# Patient Record
Sex: MALE | Race: BLACK OR AFRICAN AMERICAN | Employment: FULL TIME | URBAN - METROPOLITAN AREA
[De-identification: names, ages, dates, MRNs, and addresses within clinical notes are randomized per-mention and may not be internally consistent; named-entity substitution may affect disease eponyms.]

---

## 2017-12-13 ENCOUNTER — HOSPITAL ENCOUNTER (OUTPATIENT)
Dept: PHYSICAL THERAPY | Age: 27
End: 2017-12-13

## 2017-12-18 ENCOUNTER — HOSPITAL ENCOUNTER (OUTPATIENT)
Dept: PHYSICAL THERAPY | Age: 27
Discharge: HOME | End: 2017-12-18
Payer: COMMERCIAL

## 2017-12-18 PROCEDURE — 97110 THERAPEUTIC EXERCISES: CPT | Performed by: PHYSICAL THERAPIST

## 2017-12-18 PROCEDURE — 97161 PT EVAL LOW COMPLEX 20 MIN: CPT | Performed by: PHYSICAL THERAPIST

## 2017-12-18 NOTE — PROGRESS NOTES
PT DAILY TREATMENT NOTE     Patient Name: Urvashi August  Date:2017  : 1990  [x]  Patient  Verified  Payor: Damien Garcia / Plan: Phoebe Stanton / Product Type: Commerical /    In DCFE:8814  Out time:0930  Total Treatment Time (min): 55  Visit #: 1 of 8    Treatment Area: Bursitis of left shoulder [M75.52]  Bursitis of right shoulder [M75.51]    SUBJECTIVE  Pain Level (0-10 scale): bilateral shoulder 5/10   Any medication changes, allergies to medications, adverse drug reactions, diagnosis change, or new procedure performed?: [x] No    [] Yes (see summary sheet for update)  Subjective functional status/changes:   [] No changes reported  Bilateral shoulders     OBJECTIVE      40 min [x]Eval                  []Re-Eval       15 min Therapeutic Exercise:  [x] See flow sheet :   Rationale: increase ROM, increase strength and increase proprioception to improve the patients ability to return to prior level of function          With   [] TE   [] TA   [] neuro   [] other: Patient Education: [x] Review HEP    [] Progressed/Changed HEP based on:   [] positioning   [] body mechanics   [] transfers   [] heat/ice application    [] other:      Other Objective/Functional Measures: see eval      Pain Level (0-10 scale) post treatment: 2    ASSESSMENT/Changes in Function: pt is a motivated male age 28y/o 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 Today he presents with a hx of bilateral shoulder lateral acromium pain since increasing overhead weight lifting pounds in May 2017. Since then he notes his pain has been getting better but he has not been able to return to weight lifting UE due to concern for aggrevating his sx. Pt is only mild ttp over lateral acromium bilaterally , mild impingement sign with wesley gabby, cross arm Right , empty can left . MMT grossly 5/5 with no pain all directions.  ROM mild tight FE, and abd and posture significant for tight pecs , forward shoulder head slouched sitting posture. Pt would benefit from skilled physical therapy for bursitis /impingment symptoms bilateral shoulder to return to prior level of function. Patient will continue to benefit from skilled PT services to modify and progress therapeutic interventions, address functional mobility deficits, address ROM deficits, address strength deficits, analyze and address soft tissue restrictions, analyze and cue movement patterns, analyze and modify body mechanics/ergonomics and assess and modify postural abnormalities to attain remaining goals. [x]  See Plan of Care  []  See progress note/recertification  []  See Discharge Summary         Progress towards goals / Updated goals:  ST. Pt compliant with HEP for max progression toward all goals   @Eval : started on HEP   2. Pt  pain 40% better allowing him to return to light weight training UE at gym  @Eval: 7/10 worse         LT. Pt idep with HEP for max progression toward all goals and continued recover post therapy. @Eval : started on HEP    2. Pt  pain 80% better allowing him to return to heavier weight training UE at gym PLOF.  @Eval: 7/10 worse  3. UE = and full bilateral with all directions   @Eval: FE bilat approx 160-170, abd R170, L 165 , IR T6R/ T7 L    4. Pt demonstrates upright sitting posture without cues leading to increase freedom at shoulder joint for functional mobility overhead reach without pain.  @ Eval : very slouched forward shoulder sitting posture       PLAN  [x]  Upgrade activities as tolerated     [x]  Continue plan of care  []  Update interventions per flow sheet       []  Discharge due to:_  []  Other:_      Wild Acuña, PT 2017  8:39 AM    No future appointments.

## 2017-12-18 NOTE — PROGRESS NOTES
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 Today he presents with a hx of bilateral shoulder lateral acromium pain since increasing overhead weight lifting pounds in May 2017. Since then he notes his pain has been getting better but he has not been able to return to weight lifting UE due to concern for aggrevating his sx. Pt is only mild ttp over lateral acromium bilaterally , mild impingement sign with wesley gabby, cross arm Right , empty can left . MMT grossly 5/5 with no pain all directions. ROM mild tight FE, and abd and posture significant for tight pecs , forward shoulder head slouched sitting posture.  Pt would benefit from skilled physical therapy for bursitis /impingment symptoms bilateral shoulder to return to prior level of function.        Evaluation Complexity History LOW Complexity : Zero comorbidities / personal factors that will impact the outcome / POC; Examination HIGH Complexity : 4+ Standardized tests and measures addressing body structure, function, activity limitation and / or participation in recreation  ;Presentation LOW Complexity : Stable, uncomplicated  ;Clinical Decision Making LOW Complexity : FOTO score of   Overall Complexity Rating: LOW   Problem List: pain affecting function, decrease activity tolerance, decrease flexibility/ joint mobility                      Treatment Plan may include any combination of the following: Therapeutic exercise, Therapeutic activities, Neuromuscular re-education, Physical agent/modality,  Manual therapy, Patient education   Patient / Family readiness to learn indicated by: asking questions, trying to perform skills and interest  Persons(s) to be included in education: patient (P)  Barriers to Learning/Limitations: None    Patient Goal (s): releif    Patient Self Reported Health Status: excellent    Rehabilitation Potential: excellent    Short Term Goals: To be accomplished in 2 weeks:  1. Pt compliant with HEP for max progression toward all goals   @Eval : started on HEP   2. Pt  pain 40% better allowing him to return to light weight training UE at gym  @Eval: 7/10 worse     Long Term Goals: To be accomplished in 4 weeks:  1. Pt idep with HEP for max progression toward all goals and continued recover post therapy. @Eval : started on HEP    2. Pt  pain 80% better allowing him to return to heavier weight training UE at gym PLOF.  @Eval: 7/10 worse  3. UE = and full bilateral with all directions   @Eval: FE bilat approx 160-170, abd R170, L 165 , IR T6R/ T7 L    4. Pt demonstrates upright sitting posture without cues leading to increase freedom at shoulder joint for functional mobility overhead reach without pain.  @ Eval : very slouched forward shoulder sitting posture       Frequency / Duration: Patient to be seen 2 times per week for 4 weeks.     Patient/ Caregiver education and instruction: Diagnosis, prognosis, Exercises   [x]  Plan of care has been reviewed with Pt    Lupe Daily PT 12/18/2017 12:44 PM    ________________________________________________________________________    I certify that the above Therapy Services are being furnished while the patient is under my care. I agree with the treatment plan and certify that this therapy is necessary.     Physician's Signature:___________________  Date:____________Time: _________    Please sign and return to [facility] at [address] [place] [address]     Phone: [phone]   Fax: [phone]

## 2017-12-20 ENCOUNTER — HOSPITAL ENCOUNTER (OUTPATIENT)
Dept: PHYSICAL THERAPY | Age: 27
Discharge: HOME | End: 2017-12-20
Payer: COMMERCIAL

## 2017-12-20 PROCEDURE — 97112 NEUROMUSCULAR REEDUCATION: CPT

## 2017-12-20 PROCEDURE — 97016 VASOPNEUMATIC DEVICE THERAPY: CPT

## 2017-12-20 NOTE — PROGRESS NOTES
PT DAILY TREATMENT NOTE     Patient Name: Olive Vance  Date:2017  : 1990  [x]  Patient  Verified  Payor: Tiara Shin / Plan: Aubrie Render / Product Type: Commerical /    In time:1205  Out time:1250  Total Treatment Time (min): 45  Visit #: 2 of 8    Treatment Area: Bilateral shoulder pain [M25.511, M25.512]    SUBJECTIVE  Pain Level (0-10 scale): 0/10  Any medication changes, allergies to medications, adverse drug reactions, diagnosis change, or new procedure performed?: [x] No    [] Yes (see summary sheet for update)  Subjective functional status/changes:   [] No changes reported  Pt reports that his right top of his shoulder is painful sometimes.     OBJECTIVE    Modality rationale: decrease inflammation and decrease pain to improve the patients ability to complete ADLs   Min Type Additional Details    [] Estim:  []Unatt       []IFC  []Premod                        []Other:  []w/ice   []w/heat  Position:  Location:    [] Estim: []Att    []TENS instruct  []NMES                    []Other:  []w/US   []w/ice   []w/heat  Position:  Location:    []  Traction: [] Cervical       []Lumbar                       [] Prone          []Supine                       []Intermittent   []Continuous Lbs:  [] before manual  [] after manual    []  Ultrasound: []Continuous   [] Pulsed                           []1MHz   []3MHz W/cm2:  Location:    []  Iontophoresis with dexamethasone         Location: [] Take home patch   [] In clinic    []  Ice     []  heat  []  Ice massage  []  Laser   []  Anodyne Position:  Location:    []  Laser with stim  []  Other:  Position:  Location:   10 [x]  Vasopneumatic Device Pressure:       [] lo [x] med [] hi   Temperature: [] lo [x] med [] hi   [x] Skin assessment post-treatment:  [x]intact [x]redness- no adverse reaction    []redness - adverse reaction:       35 min Neuromuscular Re-education:  []  See flow sheet :   Rationale: increase ROM, increase strength, improve coordination, improve balance and increase proprioception  to improve the patients ability to complete ADLs          With   [] TE   [] TA   [x] neuro   [] other: Patient Education: [x] Review HEP    [] Progressed/Changed HEP based on:   [x] positioning   [x] body mechanics   [] transfers   [] heat/ice application    [x] other:shoulder/chest openers      Other Objective/Functional Measures:      Pain Level (0-10 scale) post treatment: 0/10    ASSESSMENT/Changes in Function: Pt demonstrates limited thoracic mobility contributing to shoulder pain. Added thoracic mobilizers to help promote functional movement and provided HEP    Patient will continue to benefit from skilled PT services to address functional mobility deficits, address ROM deficits, address strength deficits, analyze and address soft tissue restrictions, analyze and cue movement patterns, analyze and modify body mechanics/ergonomics and assess and modify postural abnormalities to attain remaining goals. []  See Plan of Care  []  See progress note/recertification  []  See Discharge Summary         Progress towards goals / Updated goals:  Short Term Goals: To be accomplished in 2 weeks:  1. Pt compliant with HEP for max progression toward all goals   @Eval : started on HEP   Current: added to HEP  2. Pt  pain 40% better allowing him to return to light weight training UE at gym  @Eval: 7/10 worse     Current: No change  Long Term Goals: To be accomplished in 4 weeks:  1. Pt idep with HEP for max progression toward all goals and continued recover post therapy. @Eval : started on HEP    2.  Pt  pain 80% better allowing him to return to heavier weight training UE at gym PLOF.  @Eval: 7/10 worse  3. UE = and full bilateral with all directions   @Eval: FE bilat approx 160-170, abd R170, L 165 , IR T6R/ T7 L    4. Pt demonstrates upright sitting posture without cues leading to increase freedom at shoulder joint for functional mobility overhead reach without pain.  @ Eval : very slouched forward shoulder sitting posture     PLAN  []  Upgrade activities as tolerated     []  Continue plan of care  []  Update interventions per flow sheet       []  Discharge due to:_  []  Other:_      Juanito Garcia PTA 12/20/2017  2:05 PM    Future Appointments  Date Time Provider Basilio Choe   12/27/2017 11:45 AM Juanito Garcia PTA [facility]   12/29/2017 1:30 PM SHANI Crespo [facility]

## 2017-12-27 ENCOUNTER — APPOINTMENT (OUTPATIENT)
Dept: PHYSICAL THERAPY | Age: 27
End: 2017-12-27
Payer: COMMERCIAL

## 2017-12-29 ENCOUNTER — APPOINTMENT (OUTPATIENT)
Dept: PHYSICAL THERAPY | Age: 27
End: 2017-12-29
Payer: COMMERCIAL

## 2018-02-13 NOTE — PROGRESS NOTES
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 Pt compliant with HEP for max progression toward all goals   @Eval : started on HEP   Current: added to HEP  2. Pt  pain 40% better allowing him to return to light weight training UE at gym  @Eval: 7/10 worse     Current: No change  Long Term Goals: To be accomplished in 4 weeks:  1. Pt idep with HEP for max progression toward all goals and continued recover post therapy. @Eval : started on HEP    2. Pt  pain 80% better allowing him to return to heavier weight training UE at gym PLOF.  @Eval: 7/10 worse  3. UE = and full bilateral with all directions   @Eval: FE bilat approx 160-170, abd R170, L 165 , IR T6R/ T7 L    4. Pt demonstrates upright sitting posture without cues leading to increase freedom at shoulder joint for functional mobility overhead reach without pain.  @ Eval : very slouched forward shoulder sitting posture       Assessment/ Summary of Care: No progress or functional gains made as pt only seen for one visit after initial eval, with last visit seen on 17.     RECOMMENDATIONS:  [x]Discontinue therapy: []Patient has reached or is progressing toward set goals      [x]Patient is non-compliant or has abdicated      []Due to lack of appreciable progress towards set goals    Usha Chadwick, PT 2018 2:12 PM

## 2024-01-22 ENCOUNTER — TELEPHONE (OUTPATIENT)
Facility: HOSPITAL | Age: 34
End: 2024-01-22

## 2024-01-22 ENCOUNTER — HOSPITAL ENCOUNTER (OUTPATIENT)
Facility: HOSPITAL | Age: 34
Setting detail: RECURRING SERIES
Discharge: HOME | End: 2024-01-25

## 2024-01-22 NOTE — PROGRESS NOTES
Pt arrived for evaluation but during history it was discovered by our front office there may be a problem with his referring provider's licensure. Discussed with pt and opted to reschedule once clarified.    *Evaluation rescheduled due to referring provider licensure concern.*

## 2024-02-06 ENCOUNTER — HOSPITAL ENCOUNTER (OUTPATIENT)
Facility: HOSPITAL | Age: 34
Setting detail: RECURRING SERIES
Discharge: HOME | End: 2024-02-09
Payer: OTHER GOVERNMENT

## 2024-02-06 PROCEDURE — 97161 PT EVAL LOW COMPLEX 20 MIN: CPT

## 2024-02-06 PROCEDURE — 97535 SELF CARE MNGMENT TRAINING: CPT

## 2024-02-06 PROCEDURE — 97110 THERAPEUTIC EXERCISES: CPT

## 2024-02-07 NOTE — PROGRESS NOTES
mobility.   Eval Status: issued at eval    Pt will have painfree trunk ext AROM to full range to aid in functional mechanics for daily activity and improve comfort with running.  Eval Status: 75% of normal with pain in lower back    3. Pt will demonstrate 30 degrees 90/90 HS flexibility bilaterally to improve lumbopelvic mechanics with lifting tasks.   Eval Status: -40 bilat    Long Term Goals: To be accomplished in 8 weeks:  Patient will improve FOTO score to 67 points to improve functional tolerance for daily and work tasks.  Eval Status: FOTO 56  FOTO score = an established functional score where 100 = no disability    Pt will demonstrate (-) Gurpreet test bilat to aid in functional mechanics for running and lifting tasks.  Eval Status: (+) left    Pt will have 5/5 trunk ext MMT strength to return to goals of lifting with improved ease.  Eval Status: 3/5 with increased LBP    Patient will demonstrate ability to jog for 5 min on TM at 5.0 mph or better void of pain to improve running tolerance and facilitate progression to return to run program.  Eval Status: pain with running per pt  Frequency / Duration: Patient to be seen 2 times per week for 8 weeks    Patient/ Caregiver education and instruction: Diagnosis, prognosis, self care, activity modification, and exercises     [x]  Plan of care has been reviewed with PTA    Certification Period: n/a  Tee Cabral, PT 2/6/2024 7:16 PM  _____________________________________________________________________  I certify that the above Therapy Services are being furnished while the patient is under my care. I agree with the treatment plan and certify that this therapy is necessary.    Physician's Signature:_______________________ Date:_________ TIME:________                              Leigh Healy APRN -*  Insurance: Payor:  EAST / Plan:  EAST / Product Type: *No Product type* /      ** Signature, Date and Time must be completed for valid certification 
  Hip Flexion (L1,2) 5 5 []   Knee Extension (L3,4) 5 5 []   Ankle Dorsiflexion (L4) 5 5 []   Great Toe Extension (L5) 5 5 []   Ankle Plantarflexion (S1) 5 5 []   Knee Flexion (S1,2) 5 5 []   Abdominals   []   Paraspinals- Trunk ext MMT 3 pain limited  []   Back Rotators 5 5 []   Gluteus Elfego 4+ 4+ []   Hip Abduction 4+ 4+ []   Hip ER 5 5 []   Hip IR 5 5 []       Special Tests  Lumbar:  Lumb. Compression: [] Pos  [x] Neg               Lumbar Distraction:   [] Pos  [x] Neg    Quadrant:  [] Pos  [x] Neg   [] Flex  [] Ext    Sacroilliac:  Gaenslen's:  [] Right    [] Left    [] +    [x] -     Compression:  [] +    [x] -     Gapping:   [] +    [x] -     Thigh Thrust:  [] Right    [] Left    [] +    [x] -     One Finger Test:   [] Right    [] Left    [] +    [x] -            Hip: Blanca:   [] Right    [] Left    [] +    [x] -     Scour:   [] Right    [] Left    [] +    [x] -     Piriformis:  [] Right    [] Left    [] +    [x] -     Faddir:   [] Right    [] Left    [] +    [x] -    90/90 HS:   Right -40, left -40    Gurpreet Test: minimally (+) left side    Other tests/ comments:     ASSESSMENT/Changes in Function: Pt is a 32 yo male who [facility] to [facility] with c/o LBP and shoulder pain with chief complaint of back pain at this time with onset during training in June 2023. Reports no specific precipitating event, but notes overuse as cause as he significantly increased his physical activity during this time. Notes pain that has worsened since and limits his ability to run, lift, and sit for prolonged periods without pain in his back. At this time, pt has opted to focus treatment on his back as this is the larger concern, but will plan to re-assess for his shoulders at a later date as necessary and provide further treatment if indicated. Shoulders not assessed today due to time constraints and pt wish to focus on CC of LBP. Pt presents with signs and symptoms consistent with mechanical LBP with associated deficits in

## 2024-02-13 ENCOUNTER — HOSPITAL ENCOUNTER (OUTPATIENT)
Facility: HOSPITAL | Age: 34
Setting detail: RECURRING SERIES
Discharge: HOME | End: 2024-02-16
Payer: OTHER GOVERNMENT

## 2024-02-13 PROCEDURE — 97530 THERAPEUTIC ACTIVITIES: CPT

## 2024-02-13 PROCEDURE — 97112 NEUROMUSCULAR REEDUCATION: CPT

## 2024-02-13 PROCEDURE — 97110 THERAPEUTIC EXERCISES: CPT

## 2024-02-13 PROCEDURE — 97140 MANUAL THERAPY 1/> REGIONS: CPT

## 2024-02-13 NOTE — PROGRESS NOTES
ease.  Eval Status: 3/5 with increased LBP     Patient will demonstrate ability to jog for 5 min on TM at 5.0 mph or better void of pain to improve running tolerance and facilitate progression to return to run program.  Eval Status: pain with running per pt    PLAN  Yes  Continue plan of care  []  Upgrade activities as tolerated  []  Discharge due to :  []  Other:    Ron Giordano, SUNNY    2/13/2024    2:45 PM    Future Appointments   Date Time Provider Department Center   2/13/2024  6:10 PM Tee Cabral, PT [facility]   2/20/2024  8:10 AM Wilber Purcell, PT [facility]   2/22/2024  5:30 PM Wilber Purcell, PT [facility]   2/26/2024  6:10 PM Asad Roper, PT [facility]   3/1/2024  8:10 AM Wilber Purcell, PT [facility]

## 2024-02-20 ENCOUNTER — APPOINTMENT (OUTPATIENT)
Facility: HOSPITAL | Age: 34
End: 2024-02-20
Payer: OTHER GOVERNMENT

## 2024-02-20 ENCOUNTER — TELEPHONE (OUTPATIENT)
Facility: HOSPITAL | Age: 34
End: 2024-02-20

## 2024-02-22 ENCOUNTER — HOSPITAL ENCOUNTER (OUTPATIENT)
Facility: HOSPITAL | Age: 34
Setting detail: RECURRING SERIES
Discharge: HOME | End: 2024-02-25
Payer: OTHER GOVERNMENT

## 2024-02-22 PROCEDURE — 97110 THERAPEUTIC EXERCISES: CPT

## 2024-02-22 PROCEDURE — 97112 NEUROMUSCULAR REEDUCATION: CPT

## 2024-02-22 PROCEDURE — 97530 THERAPEUTIC ACTIVITIES: CPT

## 2024-02-22 NOTE — PROGRESS NOTES
PHYSICAL / OCCUPATIONAL THERAPY - DAILY TREATMENT NOTE (updated )    Patient Name: Sheldon Palma    Date: 2024    : 1990  Insurance: Payor:  EAST / Plan:  EAST / Product Type: *No Product type* /      Patient  verified Yes     Visit #   Current / Total 3 16   Time   In / Out 1735 1830   Pain   In / Out 7 5   Subjective Functional Status/Changes: \"I had a six hour car ride yesterday and that really aggravated my back. It has been hurting a lot since.\"   Changes to:  Meds, Allergies, Med Hx, Sx Hx?  If yes, update Summary List no       TREATMENT AREA =  Other low back pain [M54.59]  Pain in right shoulder [M25.511]  Pain in left shoulder [M25.512]    OBJECTIVE    Therapeutic Procedures:  Tx Min Billable or 1:1 Min (if diff from Tx Min) Procedure, Rationale, Specifics   30  59989 Therapeutic Exercise (timed):  increase ROM, strength, coordination, balance, and proprioception to improve patient's ability to progress to PLOF and address remaining functional goals. (see flow sheet as applicable)     Details if applicable:       15  47940 Therapeutic Activity (timed):  use of dynamic activities replicating functional movements to increase ROM, strength, coordination, balance, and proprioception in order to improve patient's ability to progress to PLOF and address remaining functional goals.  (see flow sheet as applicable)     Details if applicable:     10  61635 Neuromuscular Re-Education (timed):  improve balance, coordination, kinesthetic sense, posture, core stability and proprioception to improve patient's ability to develop conscious control of individual muscles and awareness of position of extremities in order to progress to PLOF and address remaining functional goals. (see flow sheet as applicable)     Details if applicable:     55  [facility] Totals Reminder: bill using total billable min of TIMED therapeutic procedures (example: do not include dry needle or estim unattended, both

## 2024-02-26 ENCOUNTER — APPOINTMENT (OUTPATIENT)
Facility: HOSPITAL | Age: 34
End: 2024-02-26
Payer: OTHER GOVERNMENT

## 2024-02-26 ENCOUNTER — TELEPHONE (OUTPATIENT)
Facility: HOSPITAL | Age: 34
End: 2024-02-26

## 2024-03-01 ENCOUNTER — HOSPITAL ENCOUNTER (OUTPATIENT)
Facility: HOSPITAL | Age: 34
Setting detail: RECURRING SERIES
Discharge: HOME | End: 2024-03-04
Payer: OTHER GOVERNMENT

## 2024-03-01 PROCEDURE — 97112 NEUROMUSCULAR REEDUCATION: CPT

## 2024-03-01 PROCEDURE — 97110 THERAPEUTIC EXERCISES: CPT

## 2024-03-01 PROCEDURE — 97530 THERAPEUTIC ACTIVITIES: CPT

## 2024-03-01 PROCEDURE — 97140 MANUAL THERAPY 1/> REGIONS: CPT

## 2024-03-01 NOTE — PROGRESS NOTES
PHYSICAL / OCCUPATIONAL THERAPY - DAILY TREATMENT NOTE (updated )    Patient Name: Sheldon Palma    Date: 3/1/2024    : 1990  Insurance: Payor: [employer] EAST / Plan: [employer] EAST / Product Type: *No Product type* /      Patient  verified Yes     Visit #   Current / Total 4 15   Time   In / Out 0811 0905   Pain   In / Out 5 5   Subjective Functional Status/Changes: \"I still get some flare ups of the pain. But, the bending forward stretches you taught me help me to get the pain to come back down.\"   Changes to:  Meds, Allergies, Med Hx, Sx Hx?  If yes, update Summary List no       TREATMENT AREA =  Other low back pain [M54.59]  Pain in right shoulder [M25.511]  Pain in left shoulder [M25.512]    OBJECTIVE    Therapeutic Procedures:  Tx Min Billable or 1:1 Min (if diff from Tx Min) Procedure, Rationale, Specifics   16  64642 Therapeutic Exercise (timed):  increase ROM, strength, coordination, balance, and proprioception to improve patient's ability to progress to PLOF and address remaining functional goals. (see flow sheet as applicable)     Details if applicable:       15  64850 Therapeutic Activity (timed):  use of dynamic activities replicating functional movements to increase ROM, strength, coordination, balance, and proprioception in order to improve patient's ability to progress to PLOF and address remaining functional goals.  (see flow sheet as applicable)     Details if applicable:     15  50065 Neuromuscular Re-Education (timed):  improve balance, coordination, kinesthetic sense, posture, core stability and proprioception to improve patient's ability to develop conscious control of individual muscles and awareness of position of extremities in order to progress to PLOF and address remaining functional goals. (see flow sheet as applicable)     Details if applicable:     10  47588 Manual Therapy (timed):  decrease pain, increase ROM, and increase tissue extensibility to improve patient's ability

## 2024-03-13 ENCOUNTER — TELEPHONE (OUTPATIENT)
Facility: HOSPITAL | Age: 34
End: 2024-03-13

## 2024-03-13 NOTE — TELEPHONE ENCOUNTER
Called pt to see if he wanted to sched more appts but he was in zoom meeting & would call us back later.

## 2024-04-03 ENCOUNTER — HOSPITAL ENCOUNTER (OUTPATIENT)
Facility: HOSPITAL | Age: 34
Setting detail: RECURRING SERIES
Discharge: HOME | End: 2024-04-06
Payer: OTHER GOVERNMENT

## 2024-04-03 PROCEDURE — 97112 NEUROMUSCULAR REEDUCATION: CPT

## 2024-04-03 PROCEDURE — 97110 THERAPEUTIC EXERCISES: CPT

## 2024-04-03 PROCEDURE — 97530 THERAPEUTIC ACTIVITIES: CPT

## 2024-04-03 NOTE — PROGRESS NOTES
PHYSICAL / OCCUPATIONAL THERAPY - DAILY TREATMENT NOTE (updated )    Patient Name: Sheldon Palma    Date: 4/3/2024    : 1990  Insurance: Payor:  EAST / Plan: [employer] EAST / Product Type: *No Product type* /      Patient  verified Yes     Visit #   Current / Total 4 15   Time   In / Out 1610 1710   Pain   In / Out 5 5   Subjective Functional Status/Changes: \"I have been out of  tow attending a family matter. Then I had to leave town for work. I am going to be more consistent over the next month. \"   Changes to:  Meds, Allergies, Med Hx, Sx Hx?  If yes, update Summary List no       TREATMENT AREA =  Other low back pain [M54.59]  Pain in right shoulder [M25.511]  Pain in left shoulder [M25.512]    OBJECTIVE    Therapeutic Procedures:  Tx Min Billable or 1:1 Min (if diff from Tx Min) Procedure, Rationale, Specifics   30  50846 Therapeutic Exercise (timed):  increase ROM, strength, coordination, balance, and proprioception to improve patient's ability to progress to PLOF and address remaining functional goals. (see flow sheet as applicable)     Details if applicable:       15  05459 Therapeutic Activity (timed):  use of dynamic activities replicating functional movements to increase ROM, strength, coordination, balance, and proprioception in order to improve patient's ability to progress to PLOF and address remaining functional goals.  (see flow sheet as applicable)     Details if applicable:     15  02962 Neuromuscular Re-Education (timed):  improve balance, coordination, kinesthetic sense, posture, core stability and proprioception to improve patient's ability to develop conscious control of individual muscles and awareness of position of extremities in order to progress to PLOF and address remaining functional goals. (see flow sheet as applicable)     Details if applicable:     0  82099 Manual Therapy (timed):  decrease pain, increase ROM, and increase tissue extensibility to improve patient's

## 2024-04-03 NOTE — PROGRESS NOTES
[facility]  [address] [address]    Phone: [phone]   Fax: [phone]    Progress Note  Patient name: Sheldon Palma Start of Care: 2024    Referral source: Leigh Healy APRN -* : 1990   Medical/Treatment Diagnosis: Other low back pain [M54.59]  Pain in right shoulder [M25.511]  Pain in left shoulder [M25.512] Onset Date:~23       Prior Hospitalization: see medical history Provider#: [id number]   Medications: Verified on Patient Summary List    Comorbidities: shoulder pain, ankle and foot pain, following up with ortho regarding left knee, sleep study upcoming due to sleep concerns    Prior Level of Function:functionally independent, no AD, active lifestyle, AD , lifts in gym several days a week      Visits from Start of Care: 5       Updated Goals/Measure of Progress:     Short Term Goals: To be accomplished in 4 weeks  Patient will be independent and compliant with HEP to progress toward goals and restore functional mobility.   Eval Status: issued at eval  Current.  In-progress, developing consistency with HEP, 4/3/2024     Pt will have painfree trunk ext AROM to full range to aid in functional mechanics for daily activity and improve comfort with running.  Eval Status: 75% of normal with pain in lower back  Current: In-progress, 75% 4/3/2024      3. Pt will demonstrate 30 degrees 90/90 HS flexibility bilaterally to improve lumbopelvic mechanics with lifting tasks.              Eval Status: -40 bilat              Current  In-progress,  significant limitation in hamstring mobility, 4/3/2024     Long Term Goals: To be accomplished in 8 weeks:  Patient will improve FOTO score to 67 points to improve functional tolerance for daily and work tasks.  Eval Status: FOTO 56  FOTO score = an established functional score where 100 = no disability     Pt will demonstrate (-) Gurpreet test bilat to aid in functional mechanics for running and

## 2024-04-05 ENCOUNTER — HOSPITAL ENCOUNTER (OUTPATIENT)
Facility: HOSPITAL | Age: 34
Setting detail: RECURRING SERIES
Discharge: HOME | End: 2024-04-08
Payer: OTHER GOVERNMENT

## 2024-04-05 PROCEDURE — 97112 NEUROMUSCULAR REEDUCATION: CPT

## 2024-04-05 PROCEDURE — 97110 THERAPEUTIC EXERCISES: CPT

## 2024-04-05 PROCEDURE — 97530 THERAPEUTIC ACTIVITIES: CPT

## 2024-04-05 NOTE — PROGRESS NOTES
PHYSICAL / OCCUPATIONAL THERAPY - DAILY TREATMENT NOTE (updated )    Patient Name: Sheldon Palma    Date: 2024    : 1990  Insurance: Payor:  EAST / Plan: [facility] / Product Type: *No Product type* /      Patient  verified Yes     Visit #   Current / Total 6 15   Time   In / Out 0811 0908   Pain   In / Out 7 5   Subjective Functional Status/Changes: \"I was pretty sore after the most recent PT workout, but I understand all the stretching might make me more sore before I start to feel better.\"   Changes to:  Meds, Allergies, Med Hx, Sx Hx?  If yes, update Summary List no       TREATMENT AREA =  Other low back pain [M54.59]  Pain in right shoulder [M25.511]  Pain in left shoulder [M25.512]    OBJECTIVE    Therapeutic Procedures:  Tx Min Billable or 1:1 Min (if diff from Tx Min) Procedure, Rationale, Specifics   27  87198 Therapeutic Exercise (timed):  increase ROM, strength, coordination, balance, and proprioception to improve patient's ability to progress to PLOF and address remaining functional goals. (see flow sheet as applicable)     Details if applicable:       15  11779 Therapeutic Activity (timed):  use of dynamic activities replicating functional movements to increase ROM, strength, coordination, balance, and proprioception in order to improve patient's ability to progress to PLOF and address remaining functional goals.  (see flow sheet as applicable)     Details if applicable:     15  52574 Neuromuscular Re-Education (timed):  improve balance, coordination, kinesthetic sense, posture, core stability and proprioception to improve patient's ability to develop conscious control of individual muscles and awareness of position of extremities in order to progress to PLOF and address remaining functional goals. (see flow sheet as applicable)     Details if applicable:     57  [facility] Totals Reminder: bill using total billable min of TIMED therapeutic procedures (example: do not include

## 2024-04-09 ENCOUNTER — HOSPITAL ENCOUNTER (OUTPATIENT)
Facility: HOSPITAL | Age: 34
Setting detail: RECURRING SERIES
End: 2024-04-09
Payer: OTHER GOVERNMENT

## 2024-04-09 ENCOUNTER — TELEPHONE (OUTPATIENT)
Facility: HOSPITAL | Age: 34
End: 2024-04-09

## 2024-04-09 NOTE — TELEPHONE ENCOUNTER
Called pt to RS appts as his body needs 24+ hrs to recover from PT. Pt decided to come tomorrow @ 7:30 & RS 6:10 appt to Fri @ 8:10. Will check sched to RS future appts.

## 2024-04-10 ENCOUNTER — HOSPITAL ENCOUNTER (OUTPATIENT)
Facility: HOSPITAL | Age: 34
Setting detail: RECURRING SERIES
Discharge: HOME | End: 2024-04-13
Payer: OTHER GOVERNMENT

## 2024-04-10 ENCOUNTER — TELEPHONE (OUTPATIENT)
Facility: HOSPITAL | Age: 34
End: 2024-04-10

## 2024-04-10 PROCEDURE — 97112 NEUROMUSCULAR REEDUCATION: CPT

## 2024-04-10 PROCEDURE — 97530 THERAPEUTIC ACTIVITIES: CPT

## 2024-04-10 PROCEDURE — 97110 THERAPEUTIC EXERCISES: CPT

## 2024-04-10 NOTE — PROGRESS NOTES
PHYSICAL / OCCUPATIONAL THERAPY - DAILY TREATMENT NOTE (updated )    Patient Name: Sheldon Palma    Date: 4/10/2024    : 1990  Insurance: Payor:  EAST / Plan:  EAST / Product Type: *No Product type* /      Patient  verified Yes     Visit #   Current / Total 7 15   Time   In / Out 0735 0837   Pain   In / Out 3 2-3   Subjective Functional Status/Changes: \"I have been going to the gym and working on my back. But, I am keeping the exercises light to work on endurance and not as much on strength.\"   Changes to:  Meds, Allergies, Med Hx, Sx Hx?  If yes, update Summary List no       TREATMENT AREA =  Other low back pain [M54.59]  Pain in right shoulder [M25.511]  Pain in left shoulder [M25.512]    OBJECTIVE    Therapeutic Procedures:  Tx Min Billable or 1:1 Min (if diff from Tx Min) Procedure, Rationale, Specifics   32  80756 Therapeutic Exercise (timed):  increase ROM, strength, coordination, balance, and proprioception to improve patient's ability to progress to PLOF and address remaining functional goals. (see flow sheet as applicable)     Details if applicable:       15  12212 Therapeutic Activity (timed):  use of dynamic activities replicating functional movements to increase ROM, strength, coordination, balance, and proprioception in order to improve patient's ability to progress to PLOF and address remaining functional goals.  (see flow sheet as applicable)     Details if applicable:     15  28418 Neuromuscular Re-Education (timed):  improve balance, coordination, kinesthetic sense, posture, core stability and proprioception to improve patient's ability to develop conscious control of individual muscles and awareness of position of extremities in order to progress to PLOF and address remaining functional goals. (see flow sheet as applicable)     Details if applicable:     62  [facility] Totals Reminder: bill using total billable min of TIMED therapeutic procedures (example: do not include

## 2024-04-12 ENCOUNTER — TELEPHONE (OUTPATIENT)
Facility: HOSPITAL | Age: 34
End: 2024-04-12

## 2024-04-16 ENCOUNTER — HOSPITAL ENCOUNTER (OUTPATIENT)
Facility: HOSPITAL | Age: 34
Setting detail: RECURRING SERIES
Discharge: HOME | End: 2024-04-19
Payer: OTHER GOVERNMENT

## 2024-04-16 PROCEDURE — 97530 THERAPEUTIC ACTIVITIES: CPT

## 2024-04-16 PROCEDURE — 97110 THERAPEUTIC EXERCISES: CPT

## 2024-04-16 NOTE — PROGRESS NOTES
PHYSICAL / OCCUPATIONAL THERAPY - DAILY TREATMENT NOTE (updated )    Patient Name: Sheldon Palma    Date: 2024    : 1990  Insurance: Payor: [employer] EAST / Plan: [employer] EAST / Product Type: *No Product type* /      Patient  verified Yes     Visit #   Current / Total 8 15   Time   In / Out 1810 1900   Pain   In / Out 3 2-3   Subjective Functional Status/Changes: \"I am stiff and sore but I think PT is helping.\"   Changes to:  Meds, Allergies, Med Hx, Sx Hx?  If yes, update Summary List no       TREATMENT AREA =  Other low back pain [M54.59]  Pain in right shoulder [M25.511]  Pain in left shoulder [M25.512]    OBJECTIVE    Therapeutic Procedures:  Tx Min Billable or 1:1 Min (if diff from Tx Min) Procedure, Rationale, Specifics   25  79412 Therapeutic Exercise (timed):  increase ROM, strength, coordination, balance, and proprioception to improve patient's ability to progress to PLOF and address remaining functional goals. (see flow sheet as applicable)     Details if applicable:       25  30032 Therapeutic Activity (timed):  use of dynamic activities replicating functional movements to increase ROM, strength, coordination, balance, and proprioception in order to improve patient's ability to progress to PLOF and address remaining functional goals.  (see flow sheet as applicable)     Details if applicable:     0  38819 Neuromuscular Re-Education (timed):  improve balance, coordination, kinesthetic sense, posture, core stability and proprioception to improve patient's ability to develop conscious control of individual muscles and awareness of position of extremities in order to progress to PLOF and address remaining functional goals. (see flow sheet as applicable)     Details if applicable:     50  [facility] Totals Reminder: bill using total billable min of TIMED therapeutic procedures (example: do not include dry needle or estim unattended, both untimed codes, in totals to left)  8-22 min = 1 unit;

## 2024-04-17 ENCOUNTER — HOSPITAL ENCOUNTER (OUTPATIENT)
Facility: HOSPITAL | Age: 34
Setting detail: RECURRING SERIES
Discharge: HOME | End: 2024-04-20
Payer: OTHER GOVERNMENT

## 2024-04-17 ENCOUNTER — APPOINTMENT (OUTPATIENT)
Facility: HOSPITAL | Age: 34
End: 2024-04-17
Payer: OTHER GOVERNMENT

## 2024-04-17 PROCEDURE — 97112 NEUROMUSCULAR REEDUCATION: CPT

## 2024-04-17 PROCEDURE — 97530 THERAPEUTIC ACTIVITIES: CPT

## 2024-04-17 PROCEDURE — 97140 MANUAL THERAPY 1/> REGIONS: CPT

## 2024-04-17 PROCEDURE — 97110 THERAPEUTIC EXERCISES: CPT

## 2024-04-17 NOTE — PROGRESS NOTES
PHYSICAL / OCCUPATIONAL THERAPY - DAILY TREATMENT NOTE (updated )    Patient Name: Sheldon Palma    Date: 2024    : 1990  Insurance: Payor: [employer] EAST / Plan: [employer] EAST / Product Type: *No Product type* /      Patient  verified Yes     Visit #   Current / Total 9 15   Time   In / Out 0729 0827   Pain   In / Out 3 2-3   Subjective Functional Status/Changes: \"I am mainly stiff, but it does get sore as well. It bothers me most sitting for a long time, but it can bother me with other things as well.\"   Changes to:  Meds, Allergies, Med Hx, Sx Hx?  If yes, update Summary List no       TREATMENT AREA =  Other low back pain [M54.59]  Pain in right shoulder [M25.511]  Pain in left shoulder [M25.512]    OBJECTIVE    Therapeutic Procedures:  Tx Min Billable or 1:1 Min (if diff from Tx Min) Procedure, Rationale, Specifics   18  90468 Therapeutic Exercise (timed):  increase ROM, strength, coordination, balance, and proprioception to improve patient's ability to progress to PLOF and address remaining functional goals. (see flow sheet as applicable)     Details if applicable:       15  99213 Therapeutic Activity (timed):  use of dynamic activities replicating functional movements to increase ROM, strength, coordination, balance, and proprioception in order to improve patient's ability to progress to PLOF and address remaining functional goals.  (see flow sheet as applicable)     Details if applicable:     15  89056 Neuromuscular Re-Education (timed):  improve balance, coordination, kinesthetic sense, posture, core stability and proprioception to improve patient's ability to develop conscious control of individual muscles and awareness of position of extremities in order to progress to PLOF and address remaining functional goals. (see flow sheet as applicable)     Details if applicable:     10  67435 Manual Therapy (timed):  decrease pain, increase ROM, increase tissue extensibility, and increase

## 2024-04-23 ENCOUNTER — HOSPITAL ENCOUNTER (OUTPATIENT)
Facility: HOSPITAL | Age: 34
Setting detail: RECURRING SERIES
Discharge: HOME | End: 2024-04-26
Payer: OTHER GOVERNMENT

## 2024-04-23 PROCEDURE — 97110 THERAPEUTIC EXERCISES: CPT

## 2024-04-23 PROCEDURE — 97530 THERAPEUTIC ACTIVITIES: CPT

## 2024-04-23 PROCEDURE — 97112 NEUROMUSCULAR REEDUCATION: CPT

## 2024-04-23 PROCEDURE — 97140 MANUAL THERAPY 1/> REGIONS: CPT

## 2024-04-23 NOTE — PROGRESS NOTES
PHYSICAL / OCCUPATIONAL THERAPY - DAILY TREATMENT NOTE (updated )    Patient Name: Sheldon Palma    Date: 2024    : 1990  Insurance: Payor: [employer] EAST / Plan: [employer] EAST / Product Type: *No Product type* /      Patient  verified Yes     Visit #   Current / Total 10 15   Time   In / Out 1812 2249   Pain   In / Out 2-3 3   Subjective Functional Status/Changes: \"My back is feeling better but I had pain in my left shoulder when I was sitting at rest. The pain was sharp.\"   Changes to:  Meds, Allergies, Med Hx, Sx Hx?  If yes, update Summary List no       TREATMENT AREA =  Other low back pain [M54.59]  Pain in right shoulder [M25.511]  Pain in left shoulder [M25.512]    OBJECTIVE    Therapeutic Procedures:  Tx Min Billable or 1:1 Min (if diff from Tx Min) Procedure, Rationale, Specifics   20  08011 Therapeutic Exercise (timed):  increase ROM, strength, coordination, balance, and proprioception to improve patient's ability to progress to PLOF and address remaining functional goals. (see flow sheet as applicable)     Details if applicable:       9  68969 Therapeutic Activity (timed):  use of dynamic activities replicating functional movements to increase ROM, strength, coordination, balance, and proprioception in order to improve patient's ability to progress to PLOF and address remaining functional goals.  (see flow sheet as applicable)     Details if applicable:     8  08105 Neuromuscular Re-Education (timed):  improve balance, coordination, kinesthetic sense, posture, core stability and proprioception to improve patient's ability to develop conscious control of individual muscles and awareness of position of extremities in order to progress to PLOF and address remaining functional goals. (see flow sheet as applicable)     Details if applicable:     10  70450 Manual Therapy (timed):  decrease pain, increase ROM, increase tissue extensibility, and increase postural awareness to improve patient's

## 2024-04-24 ENCOUNTER — HOSPITAL ENCOUNTER (OUTPATIENT)
Facility: HOSPITAL | Age: 34
Setting detail: RECURRING SERIES
Discharge: HOME | End: 2024-04-27
Payer: OTHER GOVERNMENT

## 2024-04-24 PROCEDURE — 97140 MANUAL THERAPY 1/> REGIONS: CPT

## 2024-04-24 PROCEDURE — 97530 THERAPEUTIC ACTIVITIES: CPT

## 2024-04-24 PROCEDURE — 97110 THERAPEUTIC EXERCISES: CPT

## 2024-04-24 PROCEDURE — 97112 NEUROMUSCULAR REEDUCATION: CPT

## 2024-04-24 NOTE — PROGRESS NOTES
PHYSICAL / OCCUPATIONAL THERAPY - DAILY TREATMENT NOTE (updated )    Patient Name: Sheldon Palma    Date: 2024    : 1990  Insurance: Payor: [employer] EAST / Plan: [employer] EAST / Product Type: *No Product type* /      Patient  verified Yes     Visit #   Current / Total 11 15   Time   In / Out 0728 0812   Pain   In / Out 4 3   Subjective Functional Status/Changes: \"My low back was more sore for a good while after trying the new bridge exercises last session.\"   Changes to:  Meds, Allergies, Med Hx, Sx Hx?  If yes, update Summary List no       TREATMENT AREA =  Other low back pain [M54.59]  Pain in right shoulder [M25.511]  Pain in left shoulder [M25.512]    OBJECTIVE    Therapeutic Procedures:  Tx Min Billable or 1:1 Min (if diff from Tx Min) Procedure, Rationale, Specifics   12  22401 Therapeutic Exercise (timed):  increase ROM, strength, coordination, balance, and proprioception to improve patient's ability to progress to PLOF and address remaining functional goals. (see flow sheet as applicable)     Details if applicable:       10  77327 Therapeutic Activity (timed):  use of dynamic activities replicating functional movements to increase ROM, strength, coordination, balance, and proprioception in order to improve patient's ability to progress to PLOF and address remaining functional goals.  (see flow sheet as applicable)     Details if applicable:     10  90975 Manual Therapy (timed):  decrease pain, increase ROM, and increase tissue extensibility to improve patient's ability to progress to PLOF and address remaining functional goals.  The manual therapy interventions were performed at a separate and distinct time from the therapeutic activities interventions . Details:      Details if applicable:  hamstring contract relax, thoracic extensioin mobilzations   10  80892 Neuromuscular Re-Education (timed):  improve balance, coordination, kinesthetic sense, posture, core stability and

## 2024-04-30 ENCOUNTER — TELEPHONE (OUTPATIENT)
Facility: HOSPITAL | Age: 34
End: 2024-04-30

## 2024-04-30 ENCOUNTER — APPOINTMENT (OUTPATIENT)
Facility: HOSPITAL | Age: 34
End: 2024-04-30
Payer: OTHER GOVERNMENT

## 2024-04-30 NOTE — TELEPHONE ENCOUNTER
Pt called to cx <24hrs, had bloodwork today. Advised to not do PT tonight. Confirmed appt tomorrow morning.

## 2024-05-01 ENCOUNTER — HOSPITAL ENCOUNTER (OUTPATIENT)
Facility: HOSPITAL | Age: 34
Setting detail: RECURRING SERIES
Discharge: HOME | End: 2024-05-04
Payer: OTHER GOVERNMENT

## 2024-05-01 PROCEDURE — 97112 NEUROMUSCULAR REEDUCATION: CPT

## 2024-05-01 PROCEDURE — 97110 THERAPEUTIC EXERCISES: CPT

## 2024-05-01 PROCEDURE — 97530 THERAPEUTIC ACTIVITIES: CPT

## 2024-05-01 PROCEDURE — 97140 MANUAL THERAPY 1/> REGIONS: CPT

## 2024-05-01 NOTE — PROGRESS NOTES
Physical Therapy Discharge Instructions    [facility]  [address] [address]  Phone: [phone]   Fax: [phone]      Patient: Sheldon Palma  : 1990    Continue Home Exercise Program 2 times per day for 4 weeks, then decrease to 4 times per week      Continue with    [x] Ice  as needed      [x] Heat           Follow up with MD:     [] Upon completion of therapy     [x] As needed      Recommendations:     []   Return to activity with home program    [x]   Return to activity with the following modifications:       []Post Rehab Program    []Join Independent aquatic program     [x]Return to/join local gym      Additional Comments: Please contact clinic at above phone number if you have any questions regarding Home Exercise Program or self care instructions.

## 2024-05-01 NOTE — PROGRESS NOTES
PHYSICAL / OCCUPATIONAL THERAPY - DAILY TREATMENT NOTE (updated )    Patient Name: Sheldon Palma    Date: 2024    : 1990  Insurance: Payor: /      Patient  verified Yes     Visit #   Current / Total 12 15   Time   In / Out 0730 0823   Pain   In / Out 5 3   Subjective Functional Status/Changes: \"Overall, my low back is much better. I feel like it gets aggravated less often now and I have a better idea of how to reduce the pain now whenever it flares up.\"   Changes to:  Meds, Allergies, Med Hx, Sx Hx?  If yes, update Summary List no       TREATMENT AREA =  Other low back pain [M54.59]  Pain in right shoulder [M25.511]  Pain in left shoulder [M25.512]    OBJECTIVE    Therapeutic Procedures:  Tx Min Billable or 1:1 Min (if diff from Tx Min) Procedure, Rationale, Specifics   18  34869 Therapeutic Exercise (timed):  increase ROM, strength, coordination, balance, and proprioception to improve patient's ability to progress to PLOF and address remaining functional goals. (see flow sheet as applicable)     Details if applicable:       15  76442 Therapeutic Activity (timed):  use of dynamic activities replicating functional movements to increase ROM, strength, coordination, balance, and proprioception in order to improve patient's ability to progress to PLOF and address remaining functional goals.  (see flow sheet as applicable)     Details if applicable:     10  15605 Manual Therapy (timed):  decrease pain, increase ROM, and increase tissue extensibility to improve patient's ability to progress to PLOF and address remaining functional goals.  The manual therapy interventions were performed at a separate and distinct time from the therapeutic activities interventions . Details:       Details if applicable:  hamstring contract relax, thoracic and lumbar PA glides with oscillations   10  76968 Neuromuscular Re-Education (timed):  improve balance, coordination, kinesthetic sense, posture, core stability and

## 2024-05-01 NOTE — PROGRESS NOTES
[facility]  [address] [address]  Phone: [phone]   Fax: [phone]    Discharge Summary    Patient name: Sheldon Palma Start of Care: 2024    Referral source: Leigh Healy APRN -* : 1990   Medical/Treatment Diagnosis: Other low back pain [M54.59]  Pain in right shoulder [M25.511]  Pain in left shoulder [M25.512] Onset Date:~23        Prior Hospitalization: see medical history Provider#: [id number]   Medications: Verified on Patient Summary List     Comorbidities: shoulder pain, ankle and foot pain, following up with ortho regarding left knee, sleep study upcoming due to sleep concerns    Prior Level of Function:functionally independent, no AD, active lifestyle, AD , lifts in gym several days a week      Visits from Start of Care: 12    Missed Visits: 2    Reporting Period : 2024 to 2024    Goals/Measure of Progress:  Short Term Goals: To be accomplished in 4 weeks  Patient will be independent and compliant with HEP to progress toward goals and restore functional mobility.   Eval Status: issued at eval  Current.  MET, fully independent with comprehensive HEP, 2024     Pt will have painfree trunk ext AROM to full range to aid in functional mechanics for daily activity and improve comfort with running.  Eval Status: 75% of normal with pain in lower back  Current: MET, 100%, 2024      3. Pt will demonstrate 30 degrees 90/90 HS flexibility bilaterally to improve lumbopelvic mechanics with lifting tasks.              Eval Status: -40 bilat              Current  In-progress,  SLR right 60, left 55, 2024     Long Term Goals: To be accomplished in 8 weeks:  Patient will improve FOTO score to 67 points to improve functional tolerance for daily and work tasks.  Eval Status: FOTO 56  Current: remains 53      In-progress, 2024  FOTO score = an established functional score where 100 = no disability     Pt will